# Patient Record
Sex: MALE | Race: WHITE | Employment: FULL TIME | URBAN - METROPOLITAN AREA
[De-identification: names, ages, dates, MRNs, and addresses within clinical notes are randomized per-mention and may not be internally consistent; named-entity substitution may affect disease eponyms.]

---

## 2020-11-13 ENCOUNTER — HOSPITAL ENCOUNTER (OUTPATIENT)
Dept: WOUND CARE | Age: 30
Discharge: HOME | End: 2020-11-13
Payer: COMMERCIAL

## 2020-11-13 VITALS
HEIGHT: 64 IN | WEIGHT: 135 LBS | HEART RATE: 79 BPM | SYSTOLIC BLOOD PRESSURE: 125 MMHG | DIASTOLIC BLOOD PRESSURE: 66 MMHG | TEMPERATURE: 98.5 F | RESPIRATION RATE: 16 BRPM | BODY MASS INDEX: 23.05 KG/M2

## 2020-11-13 PROBLEM — L02.212 ABSCESS OF LOWER BACK: Status: ACTIVE | Noted: 2020-11-13

## 2020-11-13 PROBLEM — D17.1 LIPOMA OF LOWER BACK: Status: ACTIVE | Noted: 2020-11-13

## 2020-11-13 PROCEDURE — 87070 CULTURE OTHR SPECIMN AEROBIC: CPT

## 2020-11-13 PROCEDURE — 99213 OFFICE O/P EST LOW 20 MIN: CPT

## 2020-11-13 PROCEDURE — 10060 I&D ABSCESS SIMPLE/SINGLE: CPT

## 2020-11-13 PROCEDURE — 10061 I&D ABSCESS COMP/MULTIPLE: CPT | Performed by: NURSE PRACTITIONER

## 2020-11-13 PROCEDURE — 87075 CULTR BACTERIA EXCEPT BLOOD: CPT

## 2020-11-13 RX ORDER — SULFAMETHOXAZOLE AND TRIMETHOPRIM 800; 160 MG/1; MG/1
1 TABLET ORAL 2 TIMES DAILY
Qty: 20 TABLET | Refills: 0 | Status: SHIPPED | OUTPATIENT
Start: 2020-11-13 | End: 2020-11-23

## 2020-11-13 RX ORDER — BACITRACIN, NEOMYCIN, POLYMYXIN B 400; 3.5; 5 [USP'U]/G; MG/G; [USP'U]/G
OINTMENT TOPICAL ONCE
Status: CANCELLED | OUTPATIENT
Start: 2020-11-13

## 2020-11-13 RX ORDER — LIDOCAINE HYDROCHLORIDE 40 MG/ML
SOLUTION TOPICAL ONCE
Status: CANCELLED | OUTPATIENT
Start: 2020-11-13

## 2020-11-13 RX ORDER — GENTAMICIN SULFATE 1 MG/G
OINTMENT TOPICAL ONCE
Status: CANCELLED | OUTPATIENT
Start: 2020-11-13

## 2020-11-13 RX ORDER — LIDOCAINE 50 MG/G
OINTMENT TOPICAL ONCE
Status: CANCELLED | OUTPATIENT
Start: 2020-11-13

## 2020-11-13 RX ORDER — GINSENG 100 MG
CAPSULE ORAL ONCE
Status: CANCELLED | OUTPATIENT
Start: 2020-11-13

## 2020-11-13 RX ORDER — BETAMETHASONE DIPROPIONATE 0.05 %
OINTMENT (GRAM) TOPICAL ONCE
Status: CANCELLED | OUTPATIENT
Start: 2020-11-13

## 2020-11-13 RX ORDER — LIDOCAINE HYDROCHLORIDE 20 MG/ML
JELLY TOPICAL ONCE
Status: CANCELLED | OUTPATIENT
Start: 2020-11-13

## 2020-11-13 RX ORDER — LIDOCAINE 40 MG/G
CREAM TOPICAL ONCE
Status: CANCELLED | OUTPATIENT
Start: 2020-11-13

## 2020-11-13 RX ORDER — BACITRACIN ZINC AND POLYMYXIN B SULFATE 500; 1000 [USP'U]/G; [USP'U]/G
OINTMENT TOPICAL ONCE
Status: CANCELLED | OUTPATIENT
Start: 2020-11-13

## 2020-11-13 RX ORDER — CLOBETASOL PROPIONATE 0.5 MG/G
OINTMENT TOPICAL ONCE
Status: CANCELLED | OUTPATIENT
Start: 2020-11-13

## 2020-11-13 NOTE — PROGRESS NOTES
[facility] Initial Visit      Jadiel Ventura  AGE: 34 y.o. GENDER: male  : 1990  EPISODE DATE:  2020   Referred by: Dr. Shellie Adams (wound care physician at [facility])    Subjective:     CHIEF COMPLAINT Abcess to left lower back with possible lipoma     HISTORY of PRESENT ILLNESS      Jadiel Ventura is a 34 y.o. male who presents to the [address] [place] [address] for an initial visit for evaluation and treatment of Acute abcess and lipoma  ulcer(s) of  Left lower back. The condition is of moderate severity. The ulcer has been present for about 2 year. Patient has not received any treatment for this so far. The patient has significant underlying medical conditions as below. Wound Pain Timing/Severity: waxing and waning  Quality of pain: sharp, burning, squeezing  Severity of pain:  2 / 10   Modifying Factors: shear force  Associated Signs/Symptoms: erythema and pain        PAST MEDICAL HISTORY        Diagnosis Date    FHx: allergies        PAST SURGICAL HISTORY    History reviewed. No pertinent surgical history. FAMILY HISTORY    Family History   Problem Relation Age of Onset    Heart Disease Father     Diabetes Father     Cancer Maternal Grandmother     Cancer Maternal Grandfather     Hypertension Brother        SOCIAL HISTORY    Social History     Tobacco Use    Smoking status: Never Smoker    Smokeless tobacco: Never Used   Substance Use Topics    Alcohol use: Yes     Comment: occ/weekly    Drug use: No       ALLERGIES    No Known Allergies    MEDICATIONS    No current outpatient medications on file prior to encounter. No current facility-administered medications on file prior to encounter.         PROBLEM LIST    Patient Active Problem List   Diagnosis    Allergic rhinitis    Shortness of breath    WD-Abscess of lower back    WD-Lipoma of lower back       REVIEW OF SYSTEMS    Constitutional: negative for anorexia, chills, fatigue, fevers, malaise, sweats and weight loss  Respiratory: negative for asthma, cough, shortness of breath, stridor and wheezing  Cardiovascular: negative for chest pain, dyspnea, near-syncope, orthopnea, palpitations, syncope and tachypnea  Integument/breast: positive for skin lesion(s)      Objective:      /66   Pulse 79   Temp 98.5 °F (36.9 °C) (Temporal)   Resp 16   Ht 5' 4\" (1.626 m)   Wt 135 lb (61.2 kg)   BMI 23.17 kg/m²     PHYSICAL EXAM  General Appearance: alert and oriented to person, place and time, well-developed and well-nourished, in no acute distress, alert and oriented to person, place and time, well-developed and well nourished, in no acute distress and alert  Pulmonary/Chest: clear to auscultation bilaterally- no wheezes, rales or rhonchi, normal air movement, no respiratory distress and no chest wall tenderness  Cardiovascular: normal rate, normal S1 and S2, no gallops, intact distal pulses and no carotid bruits  Dermatologic exam: Visual inspection of the periwound reveals the skin to be edematous  Wound exam: see wound description below in procedure note      Assessment:       Anabell Saldana  appears to have a non-healing wound of the left lower back. The etiology of the wound is felt to be abcess with lipoma. There are multiple complicating factors including shear force. A comprehensive wound management program would be helpful to heal this wound. Assessments completed include fall risk and nutritional, functional,and psychological status. At this time appropriate care would include: periodic debridement and wound care as below.      Problem List Items Addressed This Visit     WD-Abscess of lower back    Relevant Orders    Culture, Wound    WD-Lipoma of lower back    Relevant Orders    Culture, Wound            Procedure Note    Indications:  Based on my examination of this patient's wound(s) today, sharp excision into necrotic subcutaneous tissue is required to promote healing and evaluate the extent of previous healing. Performed by: LUCA Holder CNP    Consent obtained: Yes    Time out taken:  Yes    Pain Control: Patient with continued waxing and waning pain associated with wound at baseline and increased pain with debridement. Patient was given 2% injectable lidocaine after complaining of severe pain. Patient was then comfortable and debridement and grafting was completed and well tolerated. PROCEDURE DETAILS:    ASSISTANT: Elvira Tinsley    PROCEDURE DIAGNOSIS:  Abscess     POSTPROCEDURE DIAGNOSIS: same. PROCEDURE: Incision and drainage of left lower back abscess     ANESTHESIA: Lidocaine 2% w/out epi    ESTIMATED BLOOD LOSS: Moderate    COMPLICATIONS: None. SPECIMEN: cultures. DISPOSITION: Wagner Dickerson tolerated procedure well and ambulated without any difficulty. Procedure: The patient was positioned appropriately and the skin over the incision site was prepped with betadine and draped in a sterile fashion. Local anesthesia was obtained by infiltration using 2% Lidocaine without epinephrine. An incision was then made over the apex of the lesion and approximately 5 cc of thick, purulent, bloody and straw colored material was expressed. Loculations were broken up using forceps and more of the material was able to be expressed. The drainage cavity was then packed with sterile gauze. We will pack wound with 1/4 iodiform and cover with an island dressing. We would like to see him early next week for a packing change. Cultured wound and will follow up with results. Will write a 10 day rx for bactrim. Patient educated on wound care, and we will continue to monitor. Plan:     Discharge Instructions       PHYSICIAN ORDERS AND DISCHARGE INSTRUCTIONS    NOTE: Upon discharge from the [address], you will receive a patient experience survey. We would be grateful if you would take the time to fill this survey out.     Wound care order history:         Cultures:  Obtained on 11/13/2020              Antibiotics:  Bactrim DS BID for 10 days on 11/13/2020                              Continuing wound care orders and information:              Residence:               Continue home health care with:    Your wound-care supplies will be provided by: Tyron Swan Wound cleansing:      Do not scrub or use excessive force. Wash hands with soap and water before and after dressing changes. Prior to applying a clean dressing, cleanse wound with normal saline,    wound cleanser, or mild soap and water. Ask your physician or nurse before getting the wound(s) wet in the shower. Daily Wound management:    Keep weight off wounds and reposition every 2 hours. Avoid standing for long periods of time. Apply wraps/stockings in AM and remove at bedtime. Elevate legs to the level of the heart or above for 30 minutes 4-5 times a day and/or when sitting. When taking antibiotics take entire prescription as ordered by MD do not stop taking until medicine is all gone. Orders for this week: 11/13/2020    Left lower back-- remove packing 1/4\" iodoform packing on Monday. Cover with Calcium alginate and Border guaze. Change outer dressing as needed. Follow up with  ANURAG Roberts CNP   In 1 weeks in the wound care center  Call [phone] or any questions or concerns. Date__________   Time____________                  Treatment Note Wound 11/13/20 Back Left; Lower #1 left lower back-Dressing/Treatment: (ca+ alginate border gauze)    Written Patient Dismissal Instructions Given          Electronically signed by LUCA Flores CNP on 11/13/2020 at 12:31 PM

## 2020-11-19 LAB
CULTURE: NORMAL
Lab: NORMAL
SPECIMEN: NORMAL

## 2020-11-24 ENCOUNTER — HOSPITAL ENCOUNTER (OUTPATIENT)
Dept: WOUND CARE | Age: 30
Discharge: HOME | End: 2020-11-24
Payer: COMMERCIAL

## 2020-11-24 VITALS
DIASTOLIC BLOOD PRESSURE: 60 MMHG | SYSTOLIC BLOOD PRESSURE: 137 MMHG | TEMPERATURE: 96.7 F | HEART RATE: 64 BPM | RESPIRATION RATE: 20 BRPM

## 2020-11-24 PROCEDURE — 99212 OFFICE O/P EST SF 10 MIN: CPT

## 2020-11-24 PROCEDURE — 99213 OFFICE O/P EST LOW 20 MIN: CPT | Performed by: NURSE PRACTITIONER

## 2020-11-24 RX ORDER — LIDOCAINE HYDROCHLORIDE 40 MG/ML
SOLUTION TOPICAL ONCE
Status: CANCELLED | OUTPATIENT
Start: 2020-11-24

## 2020-11-24 RX ORDER — CLOBETASOL PROPIONATE 0.5 MG/G
OINTMENT TOPICAL ONCE
Status: CANCELLED | OUTPATIENT
Start: 2020-11-24

## 2020-11-24 RX ORDER — LIDOCAINE 40 MG/G
CREAM TOPICAL ONCE
Status: CANCELLED | OUTPATIENT
Start: 2020-11-24

## 2020-11-24 RX ORDER — GINSENG 100 MG
CAPSULE ORAL ONCE
Status: CANCELLED | OUTPATIENT
Start: 2020-11-24

## 2020-11-24 RX ORDER — BACITRACIN ZINC AND POLYMYXIN B SULFATE 500; 1000 [USP'U]/G; [USP'U]/G
OINTMENT TOPICAL ONCE
Status: CANCELLED | OUTPATIENT
Start: 2020-11-24

## 2020-11-24 RX ORDER — BETAMETHASONE DIPROPIONATE 0.05 %
OINTMENT (GRAM) TOPICAL ONCE
Status: CANCELLED | OUTPATIENT
Start: 2020-11-24

## 2020-11-24 RX ORDER — GENTAMICIN SULFATE 1 MG/G
OINTMENT TOPICAL ONCE
Status: CANCELLED | OUTPATIENT
Start: 2020-11-24

## 2020-11-24 RX ORDER — BACITRACIN, NEOMYCIN, POLYMYXIN B 400; 3.5; 5 [USP'U]/G; MG/G; [USP'U]/G
OINTMENT TOPICAL ONCE
Status: CANCELLED | OUTPATIENT
Start: 2020-11-24

## 2020-11-24 RX ORDER — LIDOCAINE 50 MG/G
OINTMENT TOPICAL ONCE
Status: DISCONTINUED | OUTPATIENT
Start: 2020-11-24 | End: 2020-11-25 | Stop reason: HOSPADM

## 2020-11-24 RX ORDER — LIDOCAINE 50 MG/G
OINTMENT TOPICAL ONCE
Status: CANCELLED | OUTPATIENT
Start: 2020-11-24

## 2020-11-24 RX ORDER — LIDOCAINE HYDROCHLORIDE 20 MG/ML
JELLY TOPICAL ONCE
Status: CANCELLED | OUTPATIENT
Start: 2020-11-24

## 2020-11-24 NOTE — PROGRESS NOTES
Wound Care Center Progress Note       Katerine Vang  AGE: 34 y.o. GENDER: male  : 1990  TODAY'S DATE:  2020        Subjective:     Chief Complaint   Patient presents with    Wound Check     left lower         HISTORY of PRESENT ILLNESS     Katerine Vang is a 34 y.o. male who presents today for wound evaluation of and treatment of Acute abcess and lipoma  ulcer(s) of  Left lower back. The condition is of moderate severity. The ulcer has been present for about 2 years with I&D 2020. The patient has significant underlying medical conditions as below.      Wound Pain Timing/Severity: none  Quality of pain: N/A  Severity of pain:  0/ 10   Modifying Factors: none  Associated Signs/Symptoms: none        PAST MEDICAL HISTORY        Diagnosis Date    FHx: allergies        PAST SURGICAL HISTORY    History reviewed. No pertinent surgical history. FAMILY HISTORY    Family History   Problem Relation Age of Onset    Heart Disease Father     Diabetes Father     Cancer Maternal Grandmother     Cancer Maternal Grandfather     Hypertension Brother        SOCIAL HISTORY    Social History     Tobacco Use    Smoking status: Never Smoker    Smokeless tobacco: Never Used   Substance Use Topics    Alcohol use: Yes     Comment: occ/weekly    Drug use: No       ALLERGIES    No Known Allergies    MEDICATIONS    No current outpatient medications on file prior to encounter. No current facility-administered medications on file prior to encounter. REVIEW OF SYSTEMS    Pertinent items are noted in HPI. Constitutional: Negative for systemic symptoms including fever, chills and malaise. Objective:      /60   Pulse 64   Temp 96.7 °F (35.9 °C) (Temporal)   Resp 20     PHYSICAL EXAM    General: The patient is in no acute distress. Mental status:  Patient is appropriate, is  oriented to place and plan of care.   Dermatologic exam: Visual inspection of the periwound reveals the skin to be normal in turgor and texture. Wound exam:  see wound description below     All active wounds listed below with today's date are evaluated           Assessment:       Problem List Items Addressed This Visit     WD-Abscess of lower back - Primary    Relevant Medications    lidocaine (XYLOCAINE) 5 % ointment    Other Relevant Orders    Supply: [facility]ound    Supply: Cover and Secure    WD-Lipoma of lower back    Relevant Medications    lidocaine (XYLOCAINE) 5 % ointment    Other Relevant Orders    Supply: Vanita Wound    Supply: Cover and Secure          Status of wound progress and description from last visit: The area is filled in nicely and healed except a pin point area. Will keep dressed with a bandaid, the culture was negative for growth. To follow up if any problems or concerns. Plan:     Discharge Instructions       PHYSICIAN ORDERS AND DISCHARGE INSTRUCTIONS     NOTE: Upon discharge from the [address], you will receive a patient experience survey. We would be grateful if you would take the time to fill this survey out.     Wound care order history:                     Cultures:  Obtained on 11/13/2020              Antibiotics:  Bactrim DS BID for 10 days on 11/13/2020                                         Continuing wound care orders and information:              Residence:               Continue home health care with:               Your wound-care supplies will be provided by: Flower Nixon Wound cleansing:                           Do not scrub or use excessive force. Wash hands with soap and water before and after dressing changes. Prior to applying a clean dressing, cleanse wound with normal saline,                          wound cleanser, or mild soap and water. Ask your physician or nurse before getting the wound(s) wet in the shower.               Daily Wound management:                          Keep weight off wounds and reposition every 2 hours. Avoid standing for long periods of time. Apply wraps/stockings in AM and remove at bedtime. Elevate legs to the level of the heart or above for 30 minutes 4-5 times a day and/or when sitting. When taking antibiotics take entire prescription as ordered by MD do not stop taking until medicine is all gone.                                                                 Orders for this week: 11/24/2020     Left lower back-- Clean with soap and water. Cover with  Joyce Fanti.                   Discharged    Call [phone] or any questions or concerns.   Date__________   Time____________        Treatment Note      Written Patient Dismissal Instructions Given            Electronically signed by LUCA Cota CNP on 11/24/2020 at 9:33 AM